# Patient Record
Sex: FEMALE | Race: WHITE | NOT HISPANIC OR LATINO | URBAN - METROPOLITAN AREA
[De-identification: names, ages, dates, MRNs, and addresses within clinical notes are randomized per-mention and may not be internally consistent; named-entity substitution may affect disease eponyms.]

---

## 2018-04-12 ENCOUNTER — OUTPATIENT (OUTPATIENT)
Dept: OUTPATIENT SERVICES | Facility: HOSPITAL | Age: 23
LOS: 1 days | Discharge: HOME | End: 2018-04-12

## 2018-04-12 VITALS
HEIGHT: 65 IN | TEMPERATURE: 98 F | DIASTOLIC BLOOD PRESSURE: 55 MMHG | SYSTOLIC BLOOD PRESSURE: 100 MMHG | HEART RATE: 66 BPM | OXYGEN SATURATION: 100 % | WEIGHT: 125.66 LBS | RESPIRATION RATE: 16 BRPM

## 2018-04-12 DIAGNOSIS — M23.611 OTHER SPONTANEOUS DISRUPTION OF ANTERIOR CRUCIATE LIGAMENT OF RIGHT KNEE: ICD-10-CM

## 2018-04-12 DIAGNOSIS — E01.8 OTHER IODINE-DEFICIENCY RELATED THYROID DISORDERS AND ALLIED CONDITIONS: ICD-10-CM

## 2018-04-12 DIAGNOSIS — Z01.818 ENCOUNTER FOR OTHER PREPROCEDURAL EXAMINATION: ICD-10-CM

## 2018-04-12 RX ORDER — LEVOTHYROXINE SODIUM 125 MCG
1 TABLET ORAL
Qty: 0 | Refills: 0 | COMMUNITY

## 2018-04-12 RX ORDER — MULTIVIT-MIN/FERROUS GLUCONATE 9 MG/15 ML
1 LIQUID (ML) ORAL
Qty: 0 | Refills: 0 | COMMUNITY

## 2018-04-12 RX ORDER — PREGABALIN 225 MG/1
1 CAPSULE ORAL
Qty: 0 | Refills: 0 | COMMUNITY

## 2018-04-12 RX ORDER — LIOTHYRONINE SODIUM 25 UG/1
4 TABLET ORAL
Qty: 0 | Refills: 0 | COMMUNITY

## 2018-04-12 RX ORDER — ESCITALOPRAM OXALATE 10 MG/1
1 TABLET, FILM COATED ORAL
Qty: 0 | Refills: 0 | COMMUNITY

## 2018-04-12 RX ORDER — CHOLECALCIFEROL (VITAMIN D3) 125 MCG
1 CAPSULE ORAL
Qty: 0 | Refills: 0 | COMMUNITY

## 2018-04-12 NOTE — H&P PST ADULT - HISTORY OF PRESENT ILLNESS
"He's fixing the ACL right knee"  PT CURRENTLY DENIES CHEST PAIN, PALPITATIONS, DYSURIA,   c/o uri symptoms at present x 3 days  stuffy nose sore throat moist productive cough  yellow sputum no fever will inform surgeon if uri does not resolve  EXERCISE TOLERANCE CAN WALK AT LEAST ONE MILE  PT DENIES ANY RASHES, ABRASION, OR OPEN WOUNDS OR CUTS  AS PER THE PT, THIS IS A COMPLETE MEDICAL AND SURGICAL HX, INCLUDING MEDICATIONS PRESCRIBED AND OVER THE COUNTER      +++++SYNTHROID DOSE WAS LOWERED AFTER LABS DONE IN MARCH BY DR. RODRÍGUEZ+++++    BRUISES EASILY-DENIES DIAGNOSIS OF BLEEDING OR CLOTTING DISORDERS

## 2018-04-12 NOTE — H&P PST ADULT - NSANTHOSAYNRD_GEN_A_CORE
No. MIN screening performed.  STOP BANG Legend: 0-2 = LOW Risk; 3-4 = INTERMEDIATE Risk; 5-8 = HIGH Risk

## 2018-04-26 ENCOUNTER — RESULT REVIEW (OUTPATIENT)
Age: 23
End: 2018-04-26

## 2018-04-26 ENCOUNTER — OUTPATIENT (OUTPATIENT)
Dept: OUTPATIENT SERVICES | Facility: HOSPITAL | Age: 23
LOS: 1 days | Discharge: HOME | End: 2018-04-26

## 2018-04-26 VITALS
OXYGEN SATURATION: 100 % | RESPIRATION RATE: 22 BRPM | SYSTOLIC BLOOD PRESSURE: 116 MMHG | DIASTOLIC BLOOD PRESSURE: 65 MMHG | HEART RATE: 82 BPM

## 2018-04-26 VITALS
OXYGEN SATURATION: 98 % | TEMPERATURE: 98 F | RESPIRATION RATE: 17 BRPM | HEART RATE: 71 BPM | DIASTOLIC BLOOD PRESSURE: 61 MMHG | HEIGHT: 65 IN | WEIGHT: 125.66 LBS | SYSTOLIC BLOOD PRESSURE: 101 MMHG

## 2018-04-26 RX ORDER — MORPHINE SULFATE 50 MG/1
2 CAPSULE, EXTENDED RELEASE ORAL
Qty: 0 | Refills: 0 | Status: DISCONTINUED | OUTPATIENT
Start: 2018-04-26 | End: 2018-04-26

## 2018-04-26 RX ORDER — SODIUM CHLORIDE 9 MG/ML
1000 INJECTION, SOLUTION INTRAVENOUS
Qty: 0 | Refills: 0 | Status: DISCONTINUED | OUTPATIENT
Start: 2018-04-26 | End: 2018-05-11

## 2018-04-26 RX ORDER — OXYCODONE AND ACETAMINOPHEN 5; 325 MG/1; MG/1
1 TABLET ORAL ONCE
Qty: 0 | Refills: 0 | Status: DISCONTINUED | OUTPATIENT
Start: 2018-04-26 | End: 2018-04-26

## 2018-04-26 RX ORDER — ONDANSETRON 8 MG/1
4 TABLET, FILM COATED ORAL ONCE
Qty: 0 | Refills: 0 | Status: DISCONTINUED | OUTPATIENT
Start: 2018-04-26 | End: 2018-05-11

## 2018-04-26 RX ADMIN — MORPHINE SULFATE 2 MILLIGRAM(S): 50 CAPSULE, EXTENDED RELEASE ORAL at 11:56

## 2018-04-26 RX ADMIN — MORPHINE SULFATE 2 MILLIGRAM(S): 50 CAPSULE, EXTENDED RELEASE ORAL at 10:37

## 2018-04-26 RX ADMIN — SODIUM CHLORIDE 100 MILLILITER(S): 9 INJECTION, SOLUTION INTRAVENOUS at 10:37

## 2018-04-26 RX ADMIN — OXYCODONE AND ACETAMINOPHEN 1 TABLET(S): 5; 325 TABLET ORAL at 12:00

## 2018-04-26 RX ADMIN — OXYCODONE AND ACETAMINOPHEN 1 TABLET(S): 5; 325 TABLET ORAL at 11:48

## 2018-04-26 NOTE — ASU DISCHARGE PLAN (ADULT/PEDIATRIC). - NOTIFY
Pain not relieved by Medications/Fever greater than 101/Swelling that continues/Bleeding that does not stop/Numbness, color, or temperature change to extremity

## 2018-04-26 NOTE — BRIEF OPERATIVE NOTE - PRE-OP DX
Rupture of anterior cruciate ligament of right knee, initial encounter  04/26/2018    Active  Dave Jackson

## 2018-04-26 NOTE — ASU DISCHARGE PLAN (ADULT/PEDIATRIC). - MEDICATION SUMMARY - MEDICATIONS TO TAKE
I will START or STAY ON the medications listed below when I get home from the hospital:    oxyCODONE-acetaminophen 5 mg-325 mg oral tablet  -- 1 tab(s) by mouth every 6 hours, As Needed  -- Indication: For pain     Lexapro 5 mg oral tablet  -- 1 tab(s) by mouth once a day  -- Indication: For depression     Synthroid 137 mcg (0.137 mg) oral tablet  -- 1 tab(s) by mouth once a day  -- Indication: For thyroid     Cytomel 5 mcg oral tablet  -- 4 tab(s) by mouth once a day  -- Indication: For thyroid     Nicomide oral tablet  -- 1 tab(s) by mouth once a day  -- Indication: For vitamin     Vitamin D3 2000 intl units oral tablet  -- 1 tab(s) by mouth once a day, As Needed  -- Indication: For vitamin    Vitamin B-12 100 mcg oral tablet  -- 1 tab(s) by mouth once a day  -- Indication: For vitamin

## 2018-04-26 NOTE — PRE-ANESTHESIA EVALUATION ADULT - NSANTHPMHFT_GEN_ALL_CORE
23 yo F with above noted PMHx 21 yo F with above noted PMHx s/p skiing injury to Right knee 2/17/18.

## 2018-04-30 DIAGNOSIS — F98.5 ADULT ONSET FLUENCY DISORDER: ICD-10-CM

## 2018-04-30 DIAGNOSIS — F41.9 ANXIETY DISORDER, UNSPECIFIED: ICD-10-CM

## 2018-04-30 DIAGNOSIS — S83.511A SPRAIN OF ANTERIOR CRUCIATE LIGAMENT OF RIGHT KNEE, INITIAL ENCOUNTER: ICD-10-CM
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
## 2018-04-30 DIAGNOSIS — L71.9 ROSACEA, UNSPECIFIED: ICD-10-CM

## 2018-04-30 DIAGNOSIS — F32.9 MAJOR DEPRESSIVE DISORDER, SINGLE EPISODE, UNSPECIFIED: ICD-10-CM

## 2018-04-30 DIAGNOSIS — E03.9 HYPOTHYROIDISM, UNSPECIFIED: ICD-10-CM
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
## 2018-05-06 LAB — SURGICAL PATHOLOGY STUDY: SIGNIFICANT CHANGE UP

## 2019-08-02 NOTE — PRE-ANESTHESIA EVALUATION ADULT - NSANTHDIETYNSD_GEN_ALL_CORE
Hand irrigation performed with 60 mL sterile saline. Bourgeois placed. Clots flushed. Patient tolerated procedure well. Yes

## 2019-11-02 NOTE — BRIEF OPERATIVE NOTE - POST-OP DX
Rupture of anterior cruciate ligament of right knee, initial encounter  04/26/2018    Active  Dave Jackson Oanh Spencer)  [facility]  [address] [place] [address]  Phone: [phone]  Fax: [phone]  Follow Up Time: 7-10 Days

## 2020-04-29 ENCOUNTER — MESSAGE (OUTPATIENT)
Age: 25
End: 2020-04-29

## 2020-05-02 PROBLEM — F98.8 OTHER SPECIFIED BEHAVIORAL AND EMOTIONAL DISORDERS WITH ONSET USUALLY OCCURRING IN CHILDHOOD AND ADOLESCENCE: Chronic | Status: ACTIVE | Noted: 2018-04-12

## 2020-05-02 PROBLEM — F32.9 MAJOR DEPRESSIVE DISORDER, SINGLE EPISODE, UNSPECIFIED: Chronic | Status: ACTIVE | Noted: 2018-04-12

## 2020-05-02 PROBLEM — L71.9 ROSACEA, UNSPECIFIED: Chronic | Status: ACTIVE | Noted: 2018-04-12

## 2020-05-02 PROBLEM — F41.9 ANXIETY DISORDER, UNSPECIFIED: Chronic | Status: ACTIVE | Noted: 2018-04-12

## 2020-05-02 PROBLEM — E03.9 HYPOTHYROIDISM, UNSPECIFIED: Chronic | Status: ACTIVE | Noted: 2018-04-12

## 2020-05-04 ENCOUNTER — APPOINTMENT (OUTPATIENT)
Dept: DISASTER EMERGENCY | Facility: HOSPITAL | Age: 25
End: 2020-05-04

## 2020-05-05 LAB
SARS-COV-2 IGG SERPL IA-ACNC: 0.1 INDEX
SARS-COV-2 IGG SERPL QL IA: NEGATIVE

## 2020-10-14 PROBLEM — Z00.00 ENCOUNTER FOR PREVENTIVE HEALTH EXAMINATION: Status: ACTIVE | Noted: 2020-10-14

## 2020-11-30 ENCOUNTER — APPOINTMENT (OUTPATIENT)
Dept: DERMATOLOGY | Facility: CLINIC | Age: 25
End: 2020-11-30
Payer: COMMERCIAL

## 2020-11-30 VITALS — TEMPERATURE: 98.2 F | SYSTOLIC BLOOD PRESSURE: 106 MMHG | DIASTOLIC BLOOD PRESSURE: 75 MMHG

## 2020-11-30 DIAGNOSIS — L73.9 FOLLICULAR DISORDER, UNSPECIFIED: ICD-10-CM

## 2020-11-30 DIAGNOSIS — L65.8 OTHER SPECIFIED NONSCARRING HAIR LOSS: ICD-10-CM

## 2020-11-30 DIAGNOSIS — L65.0 TELOGEN EFFLUVIUM: ICD-10-CM

## 2020-11-30 DIAGNOSIS — L21.9 SEBORRHEIC DERMATITIS, UNSPECIFIED: ICD-10-CM

## 2020-11-30 DIAGNOSIS — Z87.2 PERSONAL HISTORY OF DISEASES OF THE SKIN AND SUBCUTANEOUS TISSUE: ICD-10-CM

## 2020-11-30 DIAGNOSIS — Z78.9 OTHER SPECIFIED HEALTH STATUS: ICD-10-CM

## 2020-11-30 DIAGNOSIS — Z91.89 OTHER SPECIFIED PERSONAL RISK FACTORS, NOT ELSEWHERE CLASSIFIED: ICD-10-CM

## 2020-11-30 PROCEDURE — 99072 ADDL SUPL MATRL&STAF TM PHE: CPT

## 2020-11-30 PROCEDURE — 99203 OFFICE O/P NEW LOW 30 MIN: CPT

## 2020-11-30 RX ORDER — LEVOTHYROXINE SODIUM 75 UG/1
75 TABLET ORAL
Qty: 30 | Refills: 0 | Status: COMPLETED | COMMUNITY
Start: 2020-08-17

## 2020-11-30 RX ORDER — CICLOPIROX 10 MG/.96ML
1 SHAMPOO TOPICAL
Qty: 1 | Refills: 3 | Status: ACTIVE | COMMUNITY
Start: 2020-11-30 | End: 1900-01-01

## 2020-11-30 RX ORDER — LIOTHYRONINE SODIUM 5 UG/1
5 TABLET ORAL
Qty: 60 | Refills: 0 | Status: COMPLETED | COMMUNITY
Start: 2020-08-17

## 2020-11-30 RX ORDER — LEVOTHYROXINE SODIUM 75 UG/1
75 TABLET ORAL
Refills: 0 | Status: ACTIVE | COMMUNITY

## 2020-11-30 RX ORDER — LEVOTHYROXINE SODIUM 88 UG/1
88 TABLET ORAL
Qty: 30 | Refills: 0 | Status: COMPLETED | COMMUNITY
Start: 2019-12-02

## 2020-11-30 RX ORDER — AZELAIC ACID 0.15 G/G
15 GEL TOPICAL
Qty: 50 | Refills: 0 | Status: ACTIVE | COMMUNITY
Start: 2020-09-19

## 2020-11-30 RX ORDER — ESCITALOPRAM OXALATE 10 MG/1
10 TABLET ORAL
Qty: 30 | Refills: 0 | Status: COMPLETED | COMMUNITY
Start: 2020-10-21

## 2020-11-30 RX ORDER — CLINDAMYCIN PHOSPHATE 10 MG/ML
1 SOLUTION TOPICAL
Qty: 1 | Refills: 3 | Status: ACTIVE | COMMUNITY
Start: 2020-11-30 | End: 1900-01-01

## 2020-11-30 NOTE — PHYSICAL EXAM
[Alert] : alert [Oriented x 3] : ~L oriented x 3 [Well Nourished] : well nourished [Conjunctiva Non-injected] : conjunctiva non-injected [No Visual Lymphadenopathy] : no visual  lymphadenopathy [No Clubbing] : no clubbing [No Edema] : no edema [No Bromhidrosis] : no bromhidrosis [No Chromhidrosis] : no chromhidrosis [FreeTextEntry3] : miniaturization across superior scalp with decreased hair density\par few hairs on pull, no significant regrowth\par numerous noninflamed follicular papules on scalp with mild flaking\par

## 2020-11-30 NOTE — HISTORY OF PRESENT ILLNESS
[FreeTextEntry1] : hair thinning  [de-identified] : 26 yo F hx hypothyroidism here for above\par noticed thinning in september \par did not see shedding but does get clumps some time nothing really significant, always sheds because curly\par saw photo and looked like balding in sept \par paternal grandfather hair thinning otherwise no fh\par last tsh 2 mos ago and is wnl but was high before (started on levothyroxine age 17) so now lowering dose, most recent change 2 mos ago\par no hx anemia

## 2021-01-25 ENCOUNTER — TRANSCRIPTION ENCOUNTER (OUTPATIENT)
Age: 26
End: 2021-01-25

## 2021-01-25 ENCOUNTER — APPOINTMENT (OUTPATIENT)
Dept: OBGYN | Facility: CLINIC | Age: 26
End: 2021-01-25
Payer: COMMERCIAL

## 2021-01-25 VITALS
HEIGHT: 65 IN | WEIGHT: 128 LBS | TEMPERATURE: 97.7 F | DIASTOLIC BLOOD PRESSURE: 60 MMHG | BODY MASS INDEX: 21.33 KG/M2 | SYSTOLIC BLOOD PRESSURE: 100 MMHG

## 2021-01-25 DIAGNOSIS — F19.90 OTHER PSYCHOACTIVE SUBSTANCE USE, UNSPECIFIED, UNCOMPLICATED: ICD-10-CM

## 2021-01-25 DIAGNOSIS — Z80.3 FAMILY HISTORY OF MALIGNANT NEOPLASM OF BREAST: ICD-10-CM

## 2021-01-25 DIAGNOSIS — Z01.419 ENCOUNTER FOR GYNECOLOGICAL EXAMINATION (GENERAL) (ROUTINE) W/OUT ABNORMAL FINDINGS: ICD-10-CM

## 2021-01-25 LAB
BILIRUB UR QL STRIP: NORMAL
GLUCOSE UR-MCNC: NORMAL
HCG UR QL: 0.2 EU/DL
HGB UR QL STRIP.AUTO: NORMAL
KETONES UR-MCNC: NORMAL
LEUKOCYTE ESTERASE UR QL STRIP: NORMAL
NITRITE UR QL STRIP: NORMAL
PH UR STRIP: 6.5
PROT UR STRIP-MCNC: NORMAL
SP GR UR STRIP: 1.01

## 2021-01-25 PROCEDURE — 99072 ADDL SUPL MATRL&STAF TM PHE: CPT

## 2021-01-25 PROCEDURE — 99385 PREV VISIT NEW AGE 18-39: CPT

## 2021-01-25 PROCEDURE — 81003 URINALYSIS AUTO W/O SCOPE: CPT | Mod: QW

## 2021-02-02 LAB
A VAGINAE DNA VAG QL NAA+PROBE: NORMAL
BVAB2 DNA VAG QL NAA+PROBE: NORMAL
C KRUSEI DNA VAG QL NAA+PROBE: NEGATIVE
C TRACH RRNA SPEC QL NAA+PROBE: NEGATIVE
CYTOLOGY CVX/VAG DOC THIN PREP: NORMAL
MEGA1 DNA VAG QL NAA+PROBE: NORMAL
N GONORRHOEA RRNA SPEC QL NAA+PROBE: NEGATIVE
T VAGINALIS RRNA SPEC QL NAA+PROBE: NEGATIVE

## 2021-03-31 LAB
HBV SURFACE AG SER QL: NONREACTIVE
HIV1+2 AB SPEC QL IA.RAPID: NONREACTIVE
T PALLIDUM AB SER QL IA: NEGATIVE

## 2021-05-24 ENCOUNTER — APPOINTMENT (OUTPATIENT)
Dept: DERMATOLOGY | Facility: CLINIC | Age: 26
End: 2021-05-24

## 2021-07-01 ENCOUNTER — NON-APPOINTMENT (OUTPATIENT)
Age: 26
End: 2021-07-01

## 2021-07-26 ENCOUNTER — APPOINTMENT (OUTPATIENT)
Dept: OBGYN | Facility: CLINIC | Age: 26
End: 2021-07-26
Payer: COMMERCIAL

## 2021-07-26 VITALS — DIASTOLIC BLOOD PRESSURE: 60 MMHG | BODY MASS INDEX: 21.63 KG/M2 | WEIGHT: 130 LBS | SYSTOLIC BLOOD PRESSURE: 100 MMHG

## 2021-07-26 DIAGNOSIS — Z30.09 ENCOUNTER FOR OTHER GENERAL COUNSELING AND ADVICE ON CONTRACEPTION: ICD-10-CM

## 2021-07-26 PROCEDURE — 99072 ADDL SUPL MATRL&STAF TM PHE: CPT

## 2021-07-26 PROCEDURE — 99212 OFFICE O/P EST SF 10 MIN: CPT

## 2021-08-09 ENCOUNTER — APPOINTMENT (OUTPATIENT)
Dept: OBGYN | Facility: CLINIC | Age: 26
End: 2021-08-09

## 2021-09-03 DIAGNOSIS — Z30.9 ENCOUNTER FOR CONTRACEPTIVE MANAGEMENT, UNSPECIFIED: ICD-10-CM

## 2021-09-03 RX ORDER — ETONOGESTREL 68 MG/1
68 IMPLANT SUBCUTANEOUS
Qty: 1 | Refills: 0 | Status: ACTIVE | COMMUNITY
Start: 2021-09-03 | End: 1900-01-01

## 2021-09-20 ENCOUNTER — APPOINTMENT (OUTPATIENT)
Dept: OBGYN | Facility: CLINIC | Age: 26
End: 2021-09-20
Payer: COMMERCIAL

## 2021-09-20 DIAGNOSIS — Z30.017 ENCOUNTER FOR INITIAL PRESCRIPTION OF IMPLANTABLE SUBDERMAL CONTRACEPTIVE: ICD-10-CM

## 2021-09-20 PROCEDURE — 11981 INSERTION DRUG DLVR IMPLANT: CPT

## 2021-09-21 PROBLEM — Z30.017 NEXPLANON INSERTION: Status: ACTIVE | Noted: 2021-09-21

## 2022-08-03 ENCOUNTER — NON-APPOINTMENT (OUTPATIENT)
Age: 27
End: 2022-08-03

## 2022-08-05 ENCOUNTER — APPOINTMENT (OUTPATIENT)
Dept: ORTHOPEDIC SURGERY | Facility: CLINIC | Age: 27
End: 2022-08-05

## 2022-08-05 VITALS — HEIGHT: 72 IN | WEIGHT: 130 LBS | BODY MASS INDEX: 17.61 KG/M2

## 2022-08-05 DIAGNOSIS — Z86.59 PERSONAL HISTORY OF OTHER MENTAL AND BEHAVIORAL DISORDERS: ICD-10-CM

## 2022-08-05 PROBLEM — Z00.00 ENCOUNTER FOR PREVENTIVE HEALTH EXAMINATION: Noted: 2022-08-05

## 2022-08-05 PROCEDURE — 99213 OFFICE O/P EST LOW 20 MIN: CPT | Mod: 25

## 2022-08-05 PROCEDURE — 73080 X-RAY EXAM OF ELBOW: CPT | Mod: RT

## 2022-08-05 RX ORDER — MELOXICAM 15 MG/1
15 TABLET ORAL DAILY
Qty: 30 | Refills: 1 | Status: ACTIVE | COMMUNITY
Start: 2022-08-05 | End: 1900-01-01

## 2022-08-05 NOTE — IMAGING
[de-identified] :  X-rays taken of the patient's right elbow in the office today revealed no obvious fractures, subluxations, or dislocations.  No significant abnormalities are seen.

## 2022-08-05 NOTE — PHYSICAL EXAM
[Right] : right elbow [5___] : supination 5[unfilled]/5 [] : no erythema [de-identified] :  no tenderness to palpation [FreeTextEntry9] :  full range of motion without pain

## 2022-08-05 NOTE — HISTORY OF PRESENT ILLNESS
[de-identified] :  Patient is a 26-year-old female who reports the office for evaluation of her right elbow pain that has been aggravating her for the past week and a half.  She states that she has been more active as of late which may have attributed to her symptoms.  Denies any direct trauma or injury to the area.  Admits to numbness and tingling that radiates down to her 4th and 5th fingers.  Certain range of motion also aggravate the patient's pain.

## 2022-08-05 NOTE — DISCUSSION/SUMMARY
[de-identified] : Mobic 15 mg PO QD PRN was sent to patient's pharmacy to help alleviate their symptoms.  The patient was advised to rest/ice the area and can alternate with warm compresses as needed.  \par \par A nighttime cubital tunnel brace was advised for support/stability.  An EMG/NCS was ordered for further evaluation.Gentle ROM exercises in Epsom salt and warm water was encouraged.\par \par Patient will follow-up in 4 weeks for further evaluation.  All of the patient's questions/concerns were answered in detail.  \par \par The patient was seen under the supervision of Dr. Nunn.\par

## 2022-08-12 ENCOUNTER — APPOINTMENT (OUTPATIENT)
Dept: NEUROLOGY | Facility: CLINIC | Age: 27
End: 2022-08-12

## 2022-08-12 PROCEDURE — 95886 MUSC TEST DONE W/N TEST COMP: CPT

## 2022-08-12 PROCEDURE — 95911 NRV CNDJ TEST 9-10 STUDIES: CPT

## 2022-08-16 ENCOUNTER — APPOINTMENT (OUTPATIENT)
Dept: ORTHOPEDIC SURGERY | Facility: CLINIC | Age: 27
End: 2022-08-16

## 2022-08-16 VITALS — BODY MASS INDEX: 21.66 KG/M2 | WEIGHT: 130 LBS | HEIGHT: 65 IN

## 2022-08-16 DIAGNOSIS — G56.21 LESION OF ULNAR NERVE, RIGHT UPPER LIMB: ICD-10-CM

## 2022-08-16 PROCEDURE — 99214 OFFICE O/P EST MOD 30 MIN: CPT

## 2022-08-16 NOTE — ASSESSMENT
[FreeTextEntry1] :  It appears though the patient has cubital tunnel syndrome.  We discussed surgical intervention versus non operative treatment of this condition.  She has only had symptoms for a very short period of time some recommending non operative treatment.  If symptoms would get worse or persist she can contact the office back to be set up for surgery which we discussed decompression versus transposition.

## 2022-08-16 NOTE — HISTORY OF PRESENT ILLNESS
[de-identified] : 26-year-old female comes in with numbness and tingling in the right hand little finger and ring finger.  It has been going on for the past several weeks.  She did have an EMG test done.  Id she denies any trauma to the area.  She may be related to some positioning of the arm.  She has adjusted some of that.  She was getting numbness and when she kept her arms over her head she stop doing that and the symptoms went away a she did again and the symptoms came and now the having on 0 1 it difficult for use her phone if she holds her phone she has more numbness in the fingers

## 2022-08-16 NOTE — PHYSICAL EXAM
[de-identified] :  Patient has full range of motion of the right elbow wrist and hand she does have a positive flex supination test on the right side.  Negative on the left side.  She has positive Tinel's across the right elbow negative cross the left elbow.  Id there is no swelling erythema ecchymoses or masses.  There is some numbness dorsally and along the little fingers.  There is no intrinsic wasting.  She has negative Wartenberg sign Froment sign bilaterally.  Id no instability

## 2022-08-24 ENCOUNTER — NON-APPOINTMENT (OUTPATIENT)
Age: 27
End: 2022-08-24

## 2022-08-30 ENCOUNTER — APPOINTMENT (OUTPATIENT)
Dept: ORTHOPEDIC SURGERY | Facility: CLINIC | Age: 27
End: 2022-08-30

## 2022-08-30 PROCEDURE — 73030 X-RAY EXAM OF SHOULDER: CPT | Mod: RT

## 2022-08-30 PROCEDURE — 99214 OFFICE O/P EST MOD 30 MIN: CPT

## 2022-08-30 RX ORDER — MELOXICAM 15 MG/1
15 TABLET ORAL
Qty: 30 | Refills: 1 | Status: ACTIVE | COMMUNITY
Start: 2022-08-30 | End: 1900-01-01

## 2022-08-30 NOTE — HISTORY OF PRESENT ILLNESS
[de-identified] : Patient is here for evaluation of right shoulder pain\par Affecting quality of life\par Wakes up at night due to pain\par \par has been more active and playing tennis with right shoulder pain\par \par NAD\par Right shoulder:\par TTP ant GH joint, bicipital groove\par FF 0-175\par ER 60\par IR T12\par 5/5 strength scapular abduction, ER, IR\par Pos Impingement\par Pos Epps\par Pos Cross Arm Adduction\par Negative instability\par \par XRay right shoulder negative for fracture, dislocation, arthritis\par \par Plan\par went over findings\par explained neg xray\par will get mri right shoulder to evaluate labrum and biceps\par mobic sent\par pt script\par fu in 6 weeks

## 2022-09-06 DIAGNOSIS — E03.9 HYPOTHYROIDISM, UNSPECIFIED: ICD-10-CM

## 2022-09-06 DIAGNOSIS — Z11.3 ENCOUNTER FOR SCREENING FOR INFECTIONS WITH A PREDOMINANTLY SEXUAL MODE OF TRANSMISSION: ICD-10-CM

## 2022-09-11 ENCOUNTER — LABORATORY RESULT (OUTPATIENT)
Age: 27
End: 2022-09-11

## 2022-09-22 ENCOUNTER — RESULT REVIEW (OUTPATIENT)
Age: 27
End: 2022-09-22

## 2022-09-22 ENCOUNTER — OUTPATIENT (OUTPATIENT)
Dept: OUTPATIENT SERVICES | Facility: HOSPITAL | Age: 27
LOS: 1 days | Discharge: HOME | End: 2022-09-22

## 2022-09-22 DIAGNOSIS — M25.511 PAIN IN RIGHT SHOULDER: ICD-10-CM

## 2022-09-22 PROCEDURE — 73221 MRI JOINT UPR EXTREM W/O DYE: CPT | Mod: 26,RT

## 2022-09-29 ENCOUNTER — APPOINTMENT (OUTPATIENT)
Age: 27
End: 2022-09-29

## 2022-10-11 ENCOUNTER — APPOINTMENT (OUTPATIENT)
Dept: ORTHOPEDIC SURGERY | Facility: CLINIC | Age: 27
End: 2022-10-11

## 2022-10-11 DIAGNOSIS — M25.511 PAIN IN RIGHT SHOULDER: ICD-10-CM

## 2022-10-11 PROCEDURE — 99213 OFFICE O/P EST LOW 20 MIN: CPT

## 2022-10-11 NOTE — HISTORY OF PRESENT ILLNESS
[de-identified] : Patient is here for evaluation of right shoulder pain\par \par She is doing much better since the last visit.\par \par NAD\par Right shoulder:\par TTP ant GH joint, bicipital groove\par FF 0-175\par ER 60\par IR T12\par 5/5 strength scapular abduction, ER, IR\par Pos Impingement\par Pos Epps\par Pos Cross Arm Adduction\par Negative instability\par \par XRay right shoulder negative for fracture, dislocation, arthritis\par \par  MRI right shoulder significant for low-grade partial-thickness rotator cuff tear, tendonitis\par \par Plan\par  I went findings with the patient.  I reviewed the MRI and spoke about the rotator cuff tear and how this should be treated conservatively.  She will continue physical therapy and home exercise.  We will continue anti-inflammatory medications as needed.  Since the pain and function are doing better she will continue conservative management.  If the pain returns she will return to the office for repeat evaluation and possible injections

## 2023-12-31 PROBLEM — Z11.3 ENCOUNTER FOR SCREENING EXAMINATION FOR SEXUALLY TRANSMITTED DISEASE: Status: RESOLVED | Noted: 2022-09-06 | Resolved: 2022-09-20

## 2024-08-13 NOTE — ASU PATIENT PROFILE, ADULT - AS SC BRADEN MOBILITY
Recommendations/Plan:  - Continue patient on Symbicort 80/4.5mcg spray MDI, 2 puffs via inhalation BID-refills given.   - Continue patient on Albuterol HFA 90mcg/Spray MDI, 2puffs  Q6Hprn. - Will send Eyup-3-Iaolzaokmhi swab today and to be followed at next clinic visit.   -Harshil Cordoba advised to continue prescribed inhalers and keep good compliance.  - Patient educated to update his pneumococcal vaccine with family physician and take influenza vaccine in coming season with out fail.   - Patient educated to quit smoking as soon as possible. Patient verbalizes understanding of adverse consequences of tobacco smoking.  -He is currently waiting for a sleep medicine consultation at [facility] sleep clinic to evaluate and treat sleep apnea.  He is currently not on any treatment for his sleep apnea. He quit using his CPAP machine 5 years back.  He was advised to keep his appointment with the [facility] sleep clinic without fail.  Obtain his old sleep study reports from [facility] sleep lab.  -Schedule patient for low dose CT scan of chest with out IV contrast as recommended by the  U.S. Preventive Services Task Force and the NCCN for lung Cancer Screening next 1 month  -Will reschedule patient for Spirometry before clinic visit with Bronchodilator response in next 1 month.  -Harshil Cordoba was advised to make early appointment with my clinic if he develops any constitutional symptoms including loss of weight, poor appetite or hemoptysis. He verbalizes under standing.  -He was advised to continue to practice good sleep hygiene practices.  -He was advised to loose weight by controlling diet and doing exercise.  -Patient advised to not use any home oxygen at rest or with exercise.  He used to be on treatment with 2 Lpm of oxygen at nighttime instead of CPAP machine from his family physician.  - Schedule patient for follow up with my clinic in 1 month with recommended test/s a low-dose CT scan of 
(4) no limitation

## 2024-09-16 NOTE — BRIEF OPERATIVE NOTE - ASSISTANT(S)
Problem: Potential for Falls  Goal: Patient will remain free of falls  Description: INTERVENTIONS:  - Educate patient/family on patient safety including physical limitations  - Instruct patient to call for assistance with activity   - Consult OT/PT to assist with strengthening/mobility   - Keep Call bell within reach  - Keep bed low and locked with side rails adjusted as appropriate  - Keep care items and personal belongings within reach  - Initiate and maintain comfort rounds  - Make Fall Risk Sign visible to staff  - Offer Toileting every  Hours, in advance of need  - Initiate/Maintain alarm  - Obtain necessary fall risk management equipment:   - Apply yellow socks and bracelet for high fall risk patients  - Consider moving patient to room near nurses station  Outcome: Progressing     Problem: PAIN - ADULT  Goal: Verbalizes/displays adequate comfort level or baseline comfort level  Description: Interventions:  - Encourage patient to monitor pain and request assistance  - Assess pain using appropriate pain scale  - Administer analgesics based on type and severity of pain and evaluate response  - Implement non-pharmacological measures as appropriate and evaluate response  - Consider cultural and social influences on pain and pain management  - Notify physician/advanced practitioner if interventions unsuccessful or patient reports new pain  Outcome: Progressing     Problem: INFECTION - ADULT  Goal: Absence or prevention of progression during hospitalization  Description: INTERVENTIONS:  - Assess and monitor for signs and symptoms of infection  - Monitor lab/diagnostic results  - Monitor all insertion sites, i.e. indwelling lines, tubes, and drains  - Monitor endotracheal if appropriate and nasal secretions for changes in amount and color  - [place] appropriate cooling/warming therapies per order  - Administer medications as ordered  - Instruct and encourage patient and family to use good hand hygiene technique  -  Identify and instruct in appropriate isolation precautions for identified infection/condition  Outcome: Progressing  Goal: Absence of fever/infection during neutropenic period  Description: INTERVENTIONS:  - Monitor WBC    Outcome: Progressing     Problem: SAFETY ADULT  Goal: Patient will remain free of falls  Description: INTERVENTIONS:  - Educate patient/family on patient safety including physical limitations  - Instruct patient to call for assistance with activity   - Consult OT/PT to assist with strengthening/mobility   - Keep Call bell within reach  - Keep bed low and locked with side rails adjusted as appropriate  - Keep care items and personal belongings within reach  - Initiate and maintain comfort rounds  - Make Fall Risk Sign visible to staff  - Offer Toileting every  Hours, in advance of need  - Initiate/Maintain alarm  - Obtain necessary fall risk management equipment:   - Apply yellow socks and bracelet for high fall risk patients  - Consider moving patient to room near nurses station  Outcome: Progressing  Goal: Maintain or return to baseline ADL function  Description: INTERVENTIONS:  -  Assess patient's ability to carry out ADLs; assess patient's baseline for ADL function and identify physical deficits which impact ability to perform ADLs (bathing, care of mouth/teeth, toileting, grooming, dressing, etc.)  - Assess/evaluate cause of self-care deficits   - Assess range of motion  - Assess patient's mobility; develop plan if impaired  - Assess patient's need for assistive devices and provide as appropriate  - Encourage maximum independence but intervene and supervise when necessary  - Involve family in performance of ADLs  - Assess for home care needs following discharge   - Consider OT consult to assist with ADL evaluation and planning for discharge  - Provide patient education as appropriate  Outcome: Progressing  Goal: Maintains/Returns to pre admission functional level  Description:  mohit ashton INTERVENTIONS:  - Perform AM-PAC 6 Click Basic Mobility/ Daily Activity assessment daily.  - Set and communicate daily mobility goal to care team and patient/family/caregiver.   - Collaborate with rehabilitation services on mobility goals if consulted  - Perform Range of Motion  times a day.  - Reposition patient every  hours.  - Dangle patient  times a day  - Stand patient  times a day  - Ambulate patient  times a day  - Out of bed to chair  times a day   - Out of bed for meals  times a day  - Out of bed for toileting  - Record patient progress and toleration of activity level   Outcome: Progressing     Problem: DISCHARGE PLANNING  Goal: Discharge to home or other facility with appropriate resources  Description: INTERVENTIONS:  - Identify barriers to discharge w/patient and caregiver  - Arrange for needed discharge resources and transportation as appropriate  - Identify discharge learning needs (meds, wound care, etc.)  - Arrange for interpretive services to assist at discharge as needed  - Refer to Case Management Department for coordinating discharge planning if the patient needs post-hospital services based on physician/advanced practitioner order or complex needs related to functional status, cognitive ability, or social support system  Outcome: Progressing     Problem: Knowledge Deficit  Goal: Patient/family/caregiver demonstrates understanding of disease process, treatment plan, medications, and discharge instructions  Description: Complete learning assessment and assess knowledge base.  Interventions:  - Provide teaching at level of understanding  - Provide teaching via preferred learning methods  Outcome: Progressing     Problem: NEUROSENSORY - ADULT  Goal: Achieves stable or improved neurological status  Description: INTERVENTIONS  - Monitor and report changes in neurological status  - Monitor vital signs such as temperature, blood pressure, glucose, and any other labs ordered   - Initiate measures to  prevent increased intracranial pressure  - Monitor for seizure activity and implement precautions if appropriate      Outcome: Progressing  Goal: Remains free of injury related to seizures activity  Description: INTERVENTIONS  - Maintain airway, patient safety  and administer oxygen as ordered  - Monitor patient for seizure activity, document and report duration and description of seizure to physician/advanced practitioner  - If seizure occurs,  ensure patient safety during seizure  - Reorient patient post seizure  - Seizure pads on all 4 side rails  - Instruct patient/family to notify RN of any seizure activity including if an aura is experienced  - Instruct patient/family to call for assistance with activity based on nursing assessment  - Administer anti-seizure medications if ordered    Outcome: Progressing  Goal: Achieves maximal functionality and self care  Description: INTERVENTIONS  - Monitor swallowing and airway patency with patient fatigue and changes in neurological status  - Encourage and assist patient to increase activity and self care.   - Encourage visually impaired, hearing impaired and aphasic patients to use assistive/communication devices  Outcome: Progressing     Problem: GENITOURINARY - ADULT  Goal: Maintains or returns to baseline urinary function  Description: INTERVENTIONS:  - Assess urinary function  - Encourage oral fluids to ensure adequate hydration if ordered  - Administer IV fluids as ordered to ensure adequate hydration  - Administer ordered medications as needed  - Offer frequent toileting  - Follow urinary retention protocol if ordered  Outcome: Progressing  Goal: Absence of urinary retention  Description: INTERVENTIONS:  - Assess patient’s ability to void and empty bladder  - Monitor I/O  - Bladder scan as needed  - Discuss with physician/AP medications to alleviate retention as needed  - Discuss catheterization for long term situations as appropriate  Outcome: Progressing  Goal:  Urinary catheter remains patent  Description: INTERVENTIONS:  - Assess patency of urinary catheter  - If patient has a chronic lerner, consider changing catheter if non-functioning  - Follow guidelines for intermittent irrigation of non-functioning urinary catheter  Outcome: Progressing     Problem: SKIN/TISSUE INTEGRITY - ADULT  Goal: Skin Integrity remains intact(Skin Breakdown Prevention)  Description: Assess:  -Perform Regan assessment every -Clean and moisturize skin every   -Inspect skin when repositioning, toileting, and assisting with ADLS  -Assess under medical devices such as  every   -Assess extremities for adequate circulation and sensation     Bed Management:  -Have minimal linens on bed & keep smooth, unwrinkled  -Change linens as needed when moist or perspiring  -Avoid sitting or lying in one position for more than  hours while in bed  -Keep HOB at degrees     Toileting:  -Offer bedside commode  -Assess for incontinence every   -Use incontinent care products after each incontinent episode such as   Activity:  -Mobilize patient  times a day  -Encourage activity and walks on unit  -Encourage or provide ROM exercises   -Turn and reposition patient every Hours  -Use appropriate equipment to lift or move patient in bed  -Instruct/ Assist with weight shifting every  when out of bed in chair  -Consider limitation of chair time  hour intervals    Skin Care:  -Avoid use of baby powder, tape, friction and shearing, hot water or constrictive clothing  -Relieve pressure over bony prominences using   -Do not massage red bony areas    Next Steps:  -Teach patient strategies to minimize risks such as    -Consider consults to  interdisciplinary teams such as Outcome: Progressing